# Patient Record
Sex: FEMALE | Race: WHITE | URBAN - METROPOLITAN AREA
[De-identification: names, ages, dates, MRNs, and addresses within clinical notes are randomized per-mention and may not be internally consistent; named-entity substitution may affect disease eponyms.]

---

## 2022-11-11 ENCOUNTER — OFFICE VISIT (OUTPATIENT)
Dept: FAMILY MEDICINE CLINIC | Facility: CLINIC | Age: 22
End: 2022-11-11
Payer: MEDICAID

## 2022-11-11 VITALS
HEART RATE: 74 BPM | SYSTOLIC BLOOD PRESSURE: 121 MMHG | OXYGEN SATURATION: 99 % | BODY MASS INDEX: 21.26 KG/M2 | TEMPERATURE: 97 F | WEIGHT: 120 LBS | HEIGHT: 63 IN | RESPIRATION RATE: 18 BRPM | DIASTOLIC BLOOD PRESSURE: 66 MMHG

## 2022-11-11 DIAGNOSIS — H01.113 EYELID DERMATITIS, ALLERGIC/CONTACT, RIGHT: Primary | ICD-10-CM

## 2022-11-11 PROCEDURE — 3008F BODY MASS INDEX DOCD: CPT | Performed by: NURSE PRACTITIONER

## 2022-11-11 PROCEDURE — 3074F SYST BP LT 130 MM HG: CPT | Performed by: NURSE PRACTITIONER

## 2022-11-11 PROCEDURE — 99202 OFFICE O/P NEW SF 15 MIN: CPT | Performed by: NURSE PRACTITIONER

## 2022-11-11 PROCEDURE — 3078F DIAST BP <80 MM HG: CPT | Performed by: NURSE PRACTITIONER

## 2022-11-11 RX ORDER — NEOMYCIN SULFATE, POLYMYXIN B SULFATE, AND DEXAMETHASONE 3.5; 10000; 1 MG/G; [USP'U]/G; MG/G
1 OINTMENT OPHTHALMIC 3 TIMES DAILY
Qty: 3.5 G | Refills: 0 | Status: SHIPPED | OUTPATIENT
Start: 2022-11-11 | End: 2022-11-16

## 2023-06-29 ENCOUNTER — HOSPITAL ENCOUNTER (EMERGENCY)
Age: 23
Discharge: HOME | End: 2023-06-29
Attending: EMERGENCY MEDICINE
Payer: OTHER MISCELLANEOUS

## 2023-06-29 VITALS
SYSTOLIC BLOOD PRESSURE: 140 MMHG | OXYGEN SATURATION: 100 % | HEIGHT: 63 IN | DIASTOLIC BLOOD PRESSURE: 95 MMHG | WEIGHT: 120 LBS | TEMPERATURE: 98 F | BODY MASS INDEX: 21.26 KG/M2 | RESPIRATION RATE: 16 BRPM | HEART RATE: 89 BPM

## 2023-06-29 DIAGNOSIS — S91.331A PUNCTURE WOUND OF RIGHT FOOT, INITIAL ENCOUNTER: Primary | ICD-10-CM

## 2023-06-29 PROCEDURE — 99283 EMERGENCY DEPT VISIT LOW MDM: CPT

## 2023-06-29 PROCEDURE — 90471 IMMUNIZATION ADMIN: CPT

## 2024-10-25 LAB
AMB EXT CREATININE: 170 MG/DL
AMB EXT TSH: 1.99 MIU/ML

## 2024-10-30 LAB
AMB EXT ANTIBODY SCREEN: NEGATIVE
AMB EXT GLUCOSE 1HR OB: 133
AMB EXT HBSAG SCREEN: NEGATIVE
AMB EXT HEMATOCRIT: 40.3
AMB EXT HEMOGLOBIN: 13.3
AMB EXT HEPATITIS C VIRUS ANTIBODY: NON REACTIVE
AMB EXT HIV AG AB COMBO: NON REACTIVE
AMB EXT MCV: 97
AMB EXT PLATELETS: 334
AMB EXT RH FACTOR: POSITIVE
AMB EXT RPR: NON REACTIVE

## 2024-12-20 ENCOUNTER — INITIAL PRENATAL (OUTPATIENT)
Dept: OBGYN CLINIC | Facility: CLINIC | Age: 24
End: 2024-12-20
Payer: COMMERCIAL

## 2024-12-20 ENCOUNTER — ULTRASOUND ENCOUNTER (OUTPATIENT)
Facility: CLINIC | Age: 24
End: 2024-12-20
Payer: COMMERCIAL

## 2024-12-20 ENCOUNTER — TELEPHONE (OUTPATIENT)
Dept: OBGYN CLINIC | Facility: CLINIC | Age: 24
End: 2024-12-20

## 2024-12-20 VITALS
DIASTOLIC BLOOD PRESSURE: 76 MMHG | BODY MASS INDEX: 23.04 KG/M2 | SYSTOLIC BLOOD PRESSURE: 117 MMHG | HEART RATE: 90 BPM | HEIGHT: 63 IN | WEIGHT: 130.06 LBS

## 2024-12-20 DIAGNOSIS — Z12.4 CERVICAL CANCER SCREENING: ICD-10-CM

## 2024-12-20 DIAGNOSIS — Z3A.12 12 WEEKS GESTATION OF PREGNANCY (HCC): Primary | ICD-10-CM

## 2024-12-20 DIAGNOSIS — Z13.79 GENETIC SCREENING: ICD-10-CM

## 2024-12-20 PROCEDURE — 88175 CYTOPATH C/V AUTO FLUID REDO: CPT

## 2024-12-20 RX ORDER — METOCLOPRAMIDE 10 MG/1
10 TABLET ORAL EVERY 6 HOURS PRN
Qty: 20 TABLET | Refills: 0 | Status: SHIPPED | OUTPATIENT
Start: 2024-12-20

## 2024-12-20 RX ORDER — DIPHENHYDRAMINE HYDROCHLORIDE 25 MG/1
25 CAPSULE ORAL DAILY
COMMUNITY

## 2024-12-20 RX ORDER — DOXYLAMINE SUCCINATE AND PYRIDOXINE HYDROCHLORIDE, DELAYED RELEASE TABLETS 10 MG/10 MG 10; 10 MG/1; MG/1
2 TABLET, DELAYED RELEASE ORAL NIGHTLY
COMMUNITY

## 2024-12-20 NOTE — PROGRESS NOTES
Transfer- 12w0d     Transfer of care, had initial OB appointment with provider from [facility]. She brought her early US results, seen on her phone.     OBhx -    PMHx - . Denies blood transfusion, HIV, hepatitis, HSV, VTE, or congenital heart defects   Psurghx - denies   Last pap, never had one. Pap done today     24 year old , EDC by LMP (regular periods)   -NIPT & Carrier discussed - drawn today    Had PNL done, abstracted in chart and reviewed.     Blood type B+, neg antibody screen     Nausea  -is taking diclegisis - not helping.   -Rx reglan sent to pharmacy    Maternal Hx thyroid dysfunction  -per pt her mom has a \"thyroid problem.\" Unsure the type, TSH done with PNL 1.990    Paternal Hx DM   -her father is a type 2 DM  -early 1 hr GTT done - normal     Migraines with no aura  -prior pregnancy, took ibuprofen, counseled her to take Tylenol for pain relief. Ibuprofen not recommended to take if pregnant.     Prior Cannabis use  -she used to smoke it or take a gummy. She would do it twice a month. Her partner has stopped as well  -cessation encouraged.     RTC 4 wk, next appt with OBs

## 2024-12-22 DIAGNOSIS — O36.80X0 PREGNANCY WITH INCONCLUSIVE FETAL VIABILITY, SINGLE OR UNSPECIFIED FETUS (HCC): Primary | ICD-10-CM

## 2024-12-30 ENCOUNTER — TELEPHONE (OUTPATIENT)
Facility: CLINIC | Age: 24
End: 2024-12-30

## 2025-01-02 LAB
.: NORMAL
.: NORMAL

## 2025-01-06 RX ORDER — METOCLOPRAMIDE 10 MG/1
10 TABLET ORAL EVERY 6 HOURS PRN
Qty: 20 TABLET | Refills: 1 | Status: SHIPPED | OUTPATIENT
Start: 2025-01-06

## 2025-01-16 ENCOUNTER — ROUTINE PRENATAL (OUTPATIENT)
Dept: OBGYN CLINIC | Facility: CLINIC | Age: 25
End: 2025-01-16
Payer: COMMERCIAL

## 2025-01-16 VITALS
BODY MASS INDEX: 23.39 KG/M2 | WEIGHT: 132 LBS | HEART RATE: 82 BPM | SYSTOLIC BLOOD PRESSURE: 122 MMHG | HEIGHT: 63 IN | DIASTOLIC BLOOD PRESSURE: 68 MMHG

## 2025-01-16 DIAGNOSIS — Z36.89 ENCOUNTER FOR FETAL ANATOMIC SURVEY (HCC): ICD-10-CM

## 2025-01-16 DIAGNOSIS — Z34.02 ENCOUNTER FOR SUPERVISION OF NORMAL FIRST PREGNANCY IN SECOND TRIMESTER (HCC): Primary | ICD-10-CM

## 2025-01-16 DIAGNOSIS — Z3A.15 15 WEEKS GESTATION OF PREGNANCY (HCC): ICD-10-CM

## 2025-01-16 NOTE — PROGRESS NOTES
CLAUDIA 15w6d visit #2    Today pt reports nausea is better than it was before. Taking diclegis and reglan.   Otherwise no complaints.      24 year old  dated by LMP c/w 1st tri US     #Routine prenatal care  - Partner/FOB: Vineet   - Labs: reviewed from previous provider, normal  - NIPT: low risk   - msAFP: declined  - Carrier screening: negative   - Anatomy scan ordered    Nausea  -is taking diclegis & reglan   -improving      Maternal Hx thyroid dysfunction  -per pt her mom has a \"thyroid problem.\" Unsure the type, TSH done with PNL 1.990     Paternal Hx DM   -her father is a type 2 DM  -early 1 hr GTT done - normal      Migraines with no aura  -prior pregnancy, took ibuprofen, counseled her to take Tylenol for pain relief. Ibuprofen not recommended to take if pregnant.      Prior Cannabis use  -she used to smoke it or take a gummy. She would do it twice a month. Her partner has stopped as well  -cessation encouraged.        RTC 4 wk

## 2025-02-20 ENCOUNTER — ROUTINE PRENATAL (OUTPATIENT)
Dept: OBGYN CLINIC | Facility: CLINIC | Age: 25
End: 2025-02-20
Payer: COMMERCIAL

## 2025-02-20 ENCOUNTER — ULTRASOUND ENCOUNTER (OUTPATIENT)
Facility: CLINIC | Age: 25
End: 2025-02-20
Payer: COMMERCIAL

## 2025-02-20 ENCOUNTER — TELEPHONE (OUTPATIENT)
Facility: CLINIC | Age: 25
End: 2025-02-20

## 2025-02-20 VITALS
HEIGHT: 63 IN | WEIGHT: 134.19 LBS | SYSTOLIC BLOOD PRESSURE: 116 MMHG | BODY MASS INDEX: 23.78 KG/M2 | DIASTOLIC BLOOD PRESSURE: 64 MMHG | HEART RATE: 89 BPM

## 2025-02-20 DIAGNOSIS — Z36.89 ENCOUNTER FOR FETAL ANATOMIC SURVEY (HCC): ICD-10-CM

## 2025-02-20 DIAGNOSIS — Z34.90 PRENATAL CARE, ANTEPARTUM (HCC): Primary | ICD-10-CM

## 2025-02-20 DIAGNOSIS — Z3A.20 20 WEEKS GESTATION OF PREGNANCY (HCC): ICD-10-CM

## 2025-02-20 PROCEDURE — 76805 OB US >/= 14 WKS SNGL FETUS: CPT | Performed by: STUDENT IN AN ORGANIZED HEALTH CARE EDUCATION/TRAINING PROGRAM

## 2025-02-20 NOTE — PROGRESS NOTES
CLAUDIA 20.6 visit #3    +FM, no LOF, no VB  No issues - felt really good the past 2 weeks when she takes her antinausea medicne     25 year old  dated by LMP c/w  tri      #Routine prenatal care  - Partner/FOB: Vineet   - Labs: reviewed from previous provider, normal  - NIPT: low risk   - msAFP: declined  - Carrier screening: negative   - Anatomy scan later today     Maternal Hx thyroid dysfunction  -per pt her mom has a \"thyroid problem.\" Unsure the type, TSH done with PNL 1.990     Paternal Hx DM   -her father is a type 2 DM  -early 1 hr GTT done - normal      Migraines with no aura  -prior pregnancy, took ibuprofen, counseled her to take Tylenol for pain relief. Ibuprofen not recommended to take if pregnant.      Prior Cannabis use  -she used to smoke it or take a gummy. She would do it twice a month. Her partner has stopped as well  -cessation encouraged.        RTC 4 wk

## 2025-02-25 DIAGNOSIS — Z36.89 ENCOUNTER FOR FETAL ANATOMIC SURVEY (HCC): ICD-10-CM

## 2025-02-25 DIAGNOSIS — Z34.02 ENCOUNTER FOR SUPERVISION OF NORMAL FIRST PREGNANCY IN SECOND TRIMESTER (HCC): ICD-10-CM

## 2025-02-25 DIAGNOSIS — Z36.2 ENCOUNTER FOR FOLLOW-UP ULTRASOUND OF FETAL ANATOMY (HCC): Primary | ICD-10-CM

## 2025-02-27 ENCOUNTER — ULTRASOUND ENCOUNTER (OUTPATIENT)
Facility: CLINIC | Age: 25
End: 2025-02-27
Payer: COMMERCIAL

## 2025-03-13 NOTE — PROGRESS NOTES
CLAUDIA 23.6 visit #4    +FM, no LOF, no VB      25 year old  dated by LMP c/w 1st tri US     #Routine prenatal care  - Partner/FOB: Vineet   - Labs: reviewed from previous provider, normal  - NIPT: low risk   - msAFP: declined  - Carrier screening: negative   - Anatomy scan: limited views of heart --> repeat normal   - 1 hr GTT & CBC: ordered     Maternal Hx thyroid dysfunction  -per pt her mom has a \"thyroid problem.\" Unsure the type, TSH done with PNL 1.990     Paternal Hx DM   -her father is a type 2 DM  -early 1 hr GTT done - normal      Migraines with no aura  -prior pregnancy, took ibuprofen, counseled her to take Tylenol for pain relief. Ibuprofen not recommended to take if pregnant.      Prior Cannabis use  -she used to smoke it or take a gummy. She would do it twice a month. Her partner has stopped as well  -cessation encouraged.        RTC 4 wk

## 2025-04-14 NOTE — TELEPHONE ENCOUNTER
Patient needs 4 wk OB appt, due back this week, put in a request on [email or web address] for an appt- called pt to schedule but no answer no VM, sent [email or web address] message.

## 2025-04-24 NOTE — PROGRESS NOTES
CLAUDIA - 29w6d     Had a puffy patch of skin around her nipple recently but then went away, not noticeable anymore  Baby active      25 year old  dated by LMP c/w 1st tri US     #Routine prenatal care  - Partner/FOB: Vineet   - Labs: reviewed from previous provider, normal  - NIPT: low risk   - msAFP: declined  - Carrier screening: negative   - Anatomy scan: limited views of heart --> repeat normal   - 1 hr GTT & CBC: 1h GTT elevated, then 3h GTT NORMAL. Hgb 11.2  -HIV, syphilis screen ordered  -Tdap discussed, pt considering     Maternal Hx thyroid dysfunction  -per pt her mom has a \"thyroid problem.\" Unsure the type, TSH done with PNL 1.990     Paternal Hx DM   -her father is a type 2 DM  -early 1 hr GTT done - normal      Migraines with no aura  -prior pregnancy, took ibuprofen, counseled her to take Tylenol for pain relief. Ibuprofen not recommended to take if pregnant.      Prior Cannabis use  -she used to smoke it or take a gummy. She would do it twice a month. Her partner has stopped as well  -cessation encouraged.

## 2025-05-19 NOTE — PROGRESS NOTES
Talat 33w3d    She is having pelvic discomfort - belly band discussed     PABLITO dated by LMP c/w 1st tri US      #Routine prenatal care  - Partner/FOB: Vineet   - Labs: reviewed from previous provider, normal  - NIPT: low risk   - msAFP: declined  - Carrier screening: negative   - Anatomy scan: limited views of heart --> repeat normal   - 1 hr GTT & CBC: 1h GTT elevated, then 3h GTT NORMAL. Hgb 11.2  -3rd tri HIV, syphilis screen ordered- reminded pt   -Tdap discussed, given today      Maternal Hx thyroid dysfunction  -per pt her mom has a \"thyroid problem.\" Unsure the type, TSH done with PNL 1.990     Paternal Hx DM   -her father is a type 2 DM  -early 1 hr GTT done - normal      Migraines with no aura  -prior pregnancy, took ibuprofen, counseled her to take Tylenol for pain relief. Ibuprofen not recommended to take if pregnant.      Prior Cannabis use  -she used to smoke it or take a gummy. She would do it twice a month. Her partner has stopped as well  -cessation encouraged.

## 2025-06-06 NOTE — PROGRESS NOTES
Talat 36w0d    She is doing well, no complaints.baby active   -gbs done today  -sve tight 1 cm/60/-2 cephalic     PABLITO dated by LMP c/w 1st tri US      #Routine prenatal care  - Partner/FOB: Vineet   - Labs: reviewed from previous provider, normal  - NIPT: low risk   - msAFP: declined  - Carrier screening: negative   - Anatomy scan: limited views of heart --> repeat normal   - 1 hr GTT & CBC: 1h GTT elevated, then 3h GTT NORMAL. Hgb 11.2  -3rd tri HIV, syphilis screen -done   -Tdap done 5/19/25  -has peds chosen      Maternal Hx thyroid dysfunction  -per pt her mom has a \"thyroid problem.\" Unsure the type, TSH done with PNL 1.990     Paternal Hx DM   -her father is a type 2 DM  -early 1 hr GTT done - normal      Migraines with no aura  -prior pregnancy, took ibuprofen, counseled her to take Tylenol for pain relief. Ibuprofen not recommended to take if pregnant.      Prior Cannabis use  -she used to smoke it or take a gummy. She would do it twice a month. Her partner has stopped as well  -cessation encouraged.

## 2025-06-13 NOTE — TELEPHONE ENCOUNTER
----- Message from Maria G Rhoades sent at 6/13/2025  2:16 PM CDT -----  Regarding: pitocin IOL elective  Her PABLITO is 7/4/25.   Can you schedule her for any of the following dates:   7/7, 7/8, 7/9, 7/10

## 2025-06-13 NOTE — PROGRESS NOTES
Talat 37w0d    She is doing well, no complaints.   Would like IOL after her PABLITO - will send message to schedulers for pitocin IOL  Sve today 1/60/-2 cephalic     PABLITO dated by LMP c/w 1st tri US      #Routine prenatal care  - Partner/FOB: Vineet   - Labs: reviewed from previous provider, normal  - NIPT: low risk   - msAFP: declined  - Carrier screening: negative   - Anatomy scan: limited views of heart --> repeat normal   - 1 hr GTT & CBC: 1h GTT elevated, then 3h GTT NORMAL. Hgb 11.2  -3rd tri HIV, syphilis screen -done   -Tdap done 5/19/25  -has peds chosen   -gbs done      Maternal Hx thyroid dysfunction  -per pt her mom has a \"thyroid problem.\" Unsure the type, TSH done with PNL 1.990     Paternal Hx DM   -her father is a type 2 DM  -early 1 hr GTT done - normal      Migraines with no aura  -prior pregnancy, took ibuprofen, counseled her to take Tylenol for pain relief. Ibuprofen not recommended to take if pregnant.      Prior Cannabis use  -she used to smoke it or take a gummy. She would do it twice a month. Her partner has stopped as well  -cessation encouraged.

## 2025-06-19 NOTE — PROGRESS NOTES
Talat 37w0d    Doing ok. Does have a bit of a cold - runny nose, sore throat. No fever though (checked in clinic too). No sick contacts. If starts developing fever or SOB - instructed to at least present to  for evaluation. Otherwise no other complaints. +FM    PABLITO dated by LMP c/w 1st tri US      #Routine prenatal care  - Partner/FOB: Vineet   - Labs: reviewed from previous provider, normal  - NIPT: low risk   - msAFP: declined  - Carrier screening: negative   - Anatomy scan: limited views of heart --> repeat normal   - 1 hr GTT & CBC: 1h GTT elevated, then 3h GTT NORMAL. Hgb 11.2  -3rd tri HIV, syphilis screen -done   -Tdap done 5/19/25  -has peds chosen   -gbs done   -Delivery planning: IOL scheduled 7/9      Maternal Hx thyroid dysfunction  -per pt her mom has a \"thyroid problem.\" Unsure the type, TSH done with PNL 1.990     Paternal Hx DM   -her father is a type 2 DM  -early 1 hr GTT done - normal      Migraines with no aura  -prior pregnancy, took ibuprofen, counseled her to take Tylenol for pain relief. Ibuprofen not recommended to take if pregnant.      Prior Cannabis use  -she used to smoke it or take a gummy. She would do it twice a month. Her partner has stopped as well  -cessation encouraged.

## 2025-06-27 NOTE — PROGRESS NOTES
Talat 39w0d    She is doing well, no complaints  Sve today 1/70/-2 offered to strip membranes. Pt declined       PABLITO dated by LMP c/w 1st tri US      #Routine prenatal care  - Partner/FOB: Vineet   - Labs: reviewed from previous provider, normal  - NIPT: low risk   - msAFP: declined  - Carrier screening: negative   - Anatomy scan: limited views of heart --> repeat normal   - 1 hr GTT & CBC: 1h GTT elevated, then 3h GTT NORMAL. Hgb 11.2  -3rd tri HIV, syphilis screen -done   -Tdap done 5/19/25  -has peds chosen   -gbs done   -Delivery planning: IOL scheduled 7/9      Maternal Hx thyroid dysfunction  -per pt her mom has a \"thyroid problem.\" Unsure the type, TSH done with PNL 1.990     Paternal Hx DM   -her father is a type 2 DM  -early 1 hr GTT done - normal      Migraines with no aura  -prior pregnancy, took ibuprofen, counseled her to take Tylenol for pain relief. Ibuprofen not recommended to take if pregnant.      Prior Cannabis use  -she used to smoke it or take a gummy. She would do it twice a month. Her partner has stopped as well  -cessation encouraged.

## 2025-07-05 NOTE — PROGRESS NOTES
Pt is a 25 year old female admitted to [facility],   Chief Complaint   Patient presents with    R/o Labor      Pt is 40w1d intra-uterine pregnancy. Patient noticed spotting when wiping this morning at 0500. Patient states uterine contractions start at that time and have been increasing in frequency and intensity since. Denies any leaking of fluid. Reports +fetal movement.   History obtained.  Oriented to room, staff, and plan of care.

## 2025-07-05 NOTE — ANESTHESIA PROCEDURE NOTES
Labor Analgesia    Date/Time: 7/5/2025 2:13 PM    Performed by: Rosana Jerez MD  Authorized by: Rosana Jerez MD      General Information and Staff    Start Time:  7/5/2025 2:13 PM  End Time:  7/5/2025 2:22 PM  Anesthesiologist:  Rosana Jerez MD  Performed by:  Anesthesiologist  Patient Location:  OB  Site Identification: surface landmarks    Reason for Block: labor epidural    Preanesthetic Checklist: patient identified, IV checked, risks and benefits discussed, monitors and equipment checked, pre-op evaluation, timeout performed, IV bolus, anesthesia consent and sterile technique used      Procedure Details    Patient Position:  Sitting  Prep: ChloraPrep    Monitoring:  Heart rate and continuous pulse ox  Approach:  Midline    Epidural Needle    Injection Technique:  OLAF saline  Needle Type:  Tuohy  Needle Gauge:  17 G  Needle Length:  3.375 in  Needle Insertion Depth:  5.5  Location:  L3-4    Spinal Needle    Needle Type:  Pencil-tip    Catheter    Catheter Type:  End hole  Catheter Size:  19 G  Catheter at Skin Depth:  11  Test Dose:  Negative    Assessment    Sensory Level:  T6    Additional Comments

## 2025-07-05 NOTE — PROGRESS NOTES
Patient care assumed from Haley SILVESTRE RN. Patient SVE 1.5/90/-2. Will update physician after NST

## 2025-07-05 NOTE — ANESTHESIA PREPROCEDURE EVALUATION
PRE-OP EVALUATION    Patient Name: Ivelisse Morel    Admit Diagnosis: Pregnancy (HCC) [Z34.90]    Pre-op Diagnosis: * No pre-op diagnosis entered *        Anesthesia Procedure: LABOR ANALGESIA    * No surgeons found in log *    Pre-op vitals reviewed.  Temp: 98.2 °F (36.8 °C)  Pulse: 90  Resp: 16  BP: 127/86     Body mass index is 28.52 kg/m².    Current medications reviewed.  Hospital Medications:  Current Medications[1]    Outpatient Medications:   Prescriptions Prior to Admission[2]    Allergies: Patient has no known allergies.      Anesthesia Evaluation    Patient summary reviewed.    Anesthetic Complications  (-) history of anesthetic complications         GI/Hepatic/Renal    Negative GI/hepatic/renal ROS.                             Cardiovascular    Negative cardiovascular ROS.        MET: >4                                           Endo/Other    Negative endo/other ROS.                              Pulmonary    Negative pulmonary ROS.                       Neuro/Psych    Negative neuro/psych ROS.                                  Past Surgical History[3]  Social Hx on file[4]  History   Drug Use Unknown     Available pre-op labs reviewed.  Lab Results   Component Value Date    WBC 18.9 (H) 07/05/2025    RBC 3.92 07/05/2025    HGB 11.7 (L) 07/05/2025    HCT 34.1 (L) 07/05/2025    MCV 87.0 07/05/2025    MCH 29.8 07/05/2025    MCHC 34.3 07/05/2025    RDW 12.8 07/05/2025    .0 07/05/2025               Airway      Mallampati: II  Mouth opening: >3 FB  TM distance: > 6 cm  Neck ROM: full Cardiovascular    Cardiovascular exam normal.  Rhythm: regular  Rate: normal     Dental  Comment: Denies chipped or loose teeth           Pulmonary    Pulmonary exam normal.  Breath sounds clear to auscultation bilaterally.               Other findings              ASA: 2   Plan: epidural  NPO status verified and patient meets guidelines.    Post-procedure pain management plan discussed with surgeon and  patient.    Comment: Risks discussed with the patient includes but not limited to headaches, back pain, transient or permanent neuropathy, trauma to lips, teeth, sore throat, anaphylaxis, myocardial infarction, aspiration. All questions answered. Patient wishes to proceed.  Plan/risks discussed with: patient and spouse                Present on Admission:  **None**             [1]    lactated ringers infusion   Intravenous Continuous    dextrose in lactated ringers 5% infusion   Intravenous PRN    lactated ringers IV bolus 500 mL  500 mL Intravenous PRN    acetaminophen (Tylenol Extra Strength) tab 500 mg  500 mg Oral Q6H PRN    acetaminophen (Tylenol Extra Strength) tab 1,000 mg  1,000 mg Oral Q6H PRN    ibuprofen (Motrin) tab 600 mg  600 mg Oral Once PRN    ondansetron (Zofran) 4 MG/2ML injection 4 mg  4 mg Intravenous Q6H PRN    oxyTOCIN in sodium chloride 0.9% (Pitocin) 30 Units/500mL infusion premix  62.5-900 fidencio-units/min Intravenous Continuous    terbutaline (Brethine) 1 MG/ML injection 0.25 mg  0.25 mg Subcutaneous PRN    sodium citrate-citric acid (Bicitra) 500-334 MG/5ML oral solution 30 mL  30 mL Oral PRN    ropivacaine (Naropin) 0.5% injection  30 mL Injection PRN    metoclopramide (Reglan) 5 mg/mL injection 10 mg  10 mg Intravenous Q6H PRN    calcium carbonate (Tums) chewable tab 1,000 mg  1,000 mg Oral Q4H PRN    lactated ringers IV bolus 1,000 mL  1,000 mL Intravenous Once    fentaNYL-bupivacaine 2 mcg/mL-0.125% in sodium chloride 0.9% 100 mL EPIDURAL infusion premix  12 mL/hr Epidural Continuous    fentaNYL (Sublimaze) 50 mcg/mL injection 100 mcg  100 mcg Epidural Once    lidocaine 1.5%-EPINEPHrine 1:200,000 (Xylocaine-Epinephrine) injection  5 mL Injection PRN    bupivacaine PF (Marcaine) 0.25% injection  30 mL Injection PRN    lidocaine PF (Xylocaine-MPF) 2% injection  5 mL Injection PRN    sodium chloride 0.9% PF injection 10 mL  10 mL Injection PRN    ePHEDrine (PF) 25 MG/5 ML injection 5 mg  5  mg Intravenous PRN    nalbuphine (Nubain) 10 mg/mL injection 2.5 mg  2.5 mg Intravenous Q15 Min PRN   [2]   Medications Prior to Admission   Medication Sig Dispense Refill Last Dose/Taking    Prenatal MV-Min-Fe Fum-FA-DHA (PRENATAL 1 OR) Take by mouth.   7/4/2025    metoclopramide 10 MG Oral Tab TAKE ONE TABLET BY MOUTH EVERY SIX HOURS AS NEEDED (Patient not taking: No sig reported) 20 tablet 1 More than a month    doxylamine-pyridoxine 10-10 MG Oral Tab EC Take 2 tablets by mouth nightly.   More than a month    Pyridoxine HCl (VITAMIN B-6) 25 MG Oral Tab Take 1 tablet (25 mg total) by mouth in the morning.   More than a month   [3] History reviewed. No pertinent surgical history.  [4]   Social History  Socioeconomic History    Marital status: Single   Tobacco Use    Smoking status: Never    Smokeless tobacco: Never   Vaping Use    Vaping status: Never Used   Substance and Sexual Activity    Alcohol use: Not Currently     Alcohol/week: 2.0 standard drinks of alcohol     Types: 2 Shots of liquor per week     Comment: Varely rarely special occasions/ weekend small, drinking    Drug use: Never   Other Topics Concern    Caffeine Concern No    Exercise No    Seat Belt No    Special Diet No    Stress Concern No    Weight Concern No

## 2025-07-05 NOTE — PLAN OF CARE
Problem: BIRTH - VAGINAL/ SECTION  Goal: Fetal and maternal status remain reassuring during the birth process  Description: INTERVENTIONS:  - Monitor vital signs  - Monitor fetal heart rate  - Monitor uterine activity  - Monitor labor progression (vaginal delivery)  - DVT prophylaxis (C/S delivery)  - Surgical antibiotic prophylaxis (C/S delivery)  Outcome: Progressing     Problem: PAIN - ADULT  Goal: Verbalizes/displays adequate comfort level or patient's stated pain goal  Description: INTERVENTIONS:  - Encourage pt to monitor pain and request assistance  - Assess pain using appropriate pain scale  - Administer analgesics based on type and severity of pain and evaluate response  - Implement non-pharmacological measures as appropriate and evaluate response  - Consider cultural and social influences on pain and pain management  - Manage/alleviate anxiety  - Utilize distraction and/or relaxation techniques  - Monitor for opioid side effects  - Notify MD/LIP if interventions unsuccessful or patient reports new pain  - Anticipate increased pain with activity and pre-medicate as appropriate  Outcome: Progressing     Problem: ANXIETY  Goal: Will report anxiety at manageable levels  Description: INTERVENTIONS:  - Administer medication as ordered  - Teach and rehearse alternative coping skills  - Provide emotional support with 1:1 interaction with staff  Outcome: Progressing     Problem: Patient/Family Goals  Goal: Patient/Family Long Term Goal  Description: Patient's Long Term Goal: Adequate pain management    Interventions:  - Offer pain meds and epidural  - See additional Care Plan goals for specific interventions  Outcome: Progressing  Goal: Patient/Family Short Term Goal  Description: Patient's Short Term Goal: Have a safe delivery     Interventions:   - Follow policies and procedures. Keep provider informed of pt status  - See additional Care Plan goals for specific interventions  Outcome: Progressing

## 2025-07-05 NOTE — PROGRESS NOTES
EFMs applied, FHTs 135. Pt was seen in Triage earlier today and sent home in early labor.  Pt states ctxs are now every 1.5-2 min. Pt states she continues to have bloody show, Pt denies LOF. Pt reports +FM. PT denies any problems with pregnancy. Pt denies any medical Hx. Admission assessment initiated at this time.    DISCHARGE

## 2025-07-05 NOTE — H&P
[facility]  Obstetrics and Gynecology  History & Physical    Ivelisse Morel Patient Status:  Inpatient    2000 MRN [id number]   Location [facility] LABOR & DELIVERY Attending Judith Dominguez MD   Hospital Day 0 PCP SHAE MONTES DO     CC: Patient is here for labor management     SUBJECTIVE:    Ivelisse Morel is a 25 year old  female at 40w1d Estimated Date of Delivery: 25 who is being admitted for labor management. Was seen earlier this AM with complaints of contractions q5-6 minutes. Was unchanged at 1-2 cm after 1 hour of walking. Returned with worsening pain and SVE: 5/100/-2. Admitted for further management.     Her current obstetrical history is significant for:  Migraines without aura  Prior cannabis use      Patient reports now doing well with epidural in place. Was able to get some sleep. AROM performed at 1800 and SVE now: 8-9/100/0.     PABLITO Confirmation  LMP: Patient's last menstrual period was 2024 (within days).  PABLITO: 2025, by Last Menstrual Period       Obstetric History:   OB History    Para Term  AB Living   1        SAB IAB Ectopic Multiple Live Births             # Outcome Date GA Lbr Sudheer/2nd Weight Sex Type Anes PTL Lv   1 Current              Past Medical History: Past Medical History[1]  Past Social History: Past Surgical History[2]  Family History: Family History[3]    Social History:   Social History     Tobacco Use    Smoking status: Never    Smokeless tobacco: Never   Substance Use Topics    Alcohol use: Not Currently     Alcohol/week: 2.0 standard drinks of alcohol     Types: 2 Shots of liquor per week     Comment: Varely rarely special occasions/ weekend small, drinking       Home Meds: Prescriptions Prior to Admission[4]  Allergies: Allergies[5]    OBJECTIVE:    Temp:  [98.2 °F (36.8 °C)-98.4 °F (36.9 °C)] 98.4 °F (36.9 °C)  Pulse:  [] 93  Resp:  [16] 16  BP: (106-141)/(59-92) 129/85  SpO2:  [98 %-99 %]  98 %  Body mass index is 28.52 kg/m².    General: AAO. NAD.   Lungs: no tachypnea, retractions or cyanosis  CV: normal peripheral perfusion   Abdomen: FHT present, gravid     FHT: moderate variability/135 BPM / Positive accelerations/Negative decelerations   TOCO: q 2-4 minutes    SVE: 8- / 100 / 0      Prenatal Labs Brief Review   Blood Type:   Lab Results   Component Value Date    ABO B 2025    RH Positive 2025     GBS:  Negative    Inpatient labs:  Lab Results   Component Value Date    WBC 18.9 2025    HGB 11.7 2025    HCT 34.1 2025    .0 2025       ASSESSMENT/ PLAN:    Ivelisse Morel is a 25 year old  female at 40w1d Estimated Date of Delivery: 25 who is being admitted for labor management.    Problem List[6]    1. Labor:    - continue expectant management    - AROM, clear fluid at 1800  2. Fetal monitoring: CEFM  3. GBS: negative  4. Pain: epidural in place    Risks, benefits, alternatives and possible complications have been discussed in detail with the patient.  Pre-admission, admission, and post admission procedures and expectations were discussed in detail.  All questions answered, all appropriate consents will be signed at the Hospital. Admission is planned for today.  anticipated.    Judith Dominguez MD    [facility] - OBGYN      Note to patient and family   The 21st Century Cures Act makes medical notes available to patients in the interest of transparency.  However, please be advised that this is a medical document.  It is intended as qwen-yl-vgrn communication.  It is written and medical language may contain abbreviations or verbiage that are technical and unfamiliar.  It may appear blunt or direct.  Medical documents are intended to carry relevant information, facts as evident, and the clinical opinion of the practitioner.                 [1]   Past Medical History:   Screening for genetic disease carrier status    Carrier Screen = NEGATIVE  FOR 14 OUT OF 14 DISEASES   [2] History reviewed. No pertinent surgical history.  [3]   Family History  Problem Relation Age of Onset    Diabetes Father         Type 2 diabetes    Heart Disorder Father    [4]   Medications Prior to Admission   Medication Sig Dispense Refill Last Dose/Taking    Prenatal MV-Min-Fe Fum-FA-DHA (PRENATAL 1 OR) Take by mouth.   7/4/2025    metoclopramide 10 MG Oral Tab TAKE ONE TABLET BY MOUTH EVERY SIX HOURS AS NEEDED (Patient not taking: No sig reported) 20 tablet 1 More than a month    doxylamine-pyridoxine 10-10 MG Oral Tab EC Take 2 tablets by mouth nightly.   More than a month    Pyridoxine HCl (VITAMIN B-6) 25 MG Oral Tab Take 1 tablet (25 mg total) by mouth in the morning.   More than a month   [5] No Known Allergies  [6]   Patient Active Problem List  Diagnosis    Pregnancy (HCC)    Uterine contractions (HCC)

## 2025-07-05 NOTE — PROGRESS NOTES
Pt is d/c'd to home ins table condition, not in active labor. Both written and verbalizes instructions provided. Questions answered. Pt verbalizes understanding and  agreement.

## 2025-07-05 NOTE — DISCHARGE INSTRUCTIONS
Discharge Instructions    Diet: Regular  Activity: Normal activity         General Instructions    Call your OB doctor if: Fluid leaking from your vagina;Uterine contractions 5 minutes or closer for 1 to 2 hours;Uterine contractions increasing in intensity and frequency;Decrease in fetal movement;Vaginal bleeding;Temperature greater than 100F  Early labor comfort measures: Drink fluids and eat small light meals;Relax, sleep, take a warm bath or shower for 30 minutes or less;Take a walk

## 2025-07-05 NOTE — PROGRESS NOTES
07/05/25 0955   Nonstress Test   Multiple NST? No   Variability 6-25 BPM   Accelerations Yes   Acoustic Stimulator No   Baseline 135 BPM   Uterine Irritability No   Contractions Irregular   Interpretation   Nonstress Test Interpretation Reactive

## 2025-07-06 NOTE — PROGRESS NOTES
Labor Analgesia Follow Up Note    Patient underwent epidural anesthesia for labor analgesia,    Placenta Date/Time: 7/5/2025  8:11 PM    Delivery Date/Time:: 7/5/2025  8:08 PM    /75 (BP Location: Left arm)   Pulse 101   Temp 98 °F (36.7 °C) (Oral)   Resp 17   Ht 1.6 m (5' 3\")   Wt 73 kg (161 lb)   LMP 09/27/2024 (Within Days)   SpO2 96%   Breastfeeding Yes   BMI 28.52 kg/m²     Assessment:  Patient seen and no apparent anesthesia related complications.    Thank you for asking us to participate in the care of your patient.

## 2025-07-06 NOTE — PLAN OF CARE
NURSING ADMISSION NOTE      Patient admitted via Wheelchair  Oriented to room.  Safety precautions initiated.  Bed in low position.  Call light in reach.    ID bands checked with LD RN    Problem: POSTPARTUM  Goal: Long Term Goal:Experiences normal postpartum course  Description: INTERVENTIONS:  - Assess and monitor vital signs and lab values.  - Assess fundus and lochia.  - Provide ice/sitz baths for perineum discomfort.  - Monitor healing of incision/episiotomy/laceration, and assess for signs and symptoms of infection and hematoma.  - Assess bladder function and monitor for bladder distention.  - Provide/instruct/assist with pericare as needed.  - Provide VTE prophylaxis as needed.  - Monitor bowel function.  - Encourage ambulation and provide assistance as needed.  - Assess and monitor emotional status and provide social service/psych resources as needed.  - Utilize standard precautions and use personal protective equipment as indicated. Ensure aseptic care of all intravenous lines and invasive tubes/drains.  - Obtain immunization and exposure to communicable diseases history.  Outcome: Progressing  Goal: Optimize infant feeding at the breast  Description: INTERVENTIONS:  - Initiate breast feeding within first hour after birth.   - Monitor effectiveness of current breast feeding efforts.  - Assess support systems available to mother/family.  - Identify cultural beliefs/practices regarding lactation, letdown techniques, maternal food preferences.  - Assess mother's knowledge and previous experience with breast feeding.  - Provide information as needed about early infant feeding cues (e.g., rooting, lip smacking, sucking fingers/hand) versus late cue of crying.  - Discuss/demonstrate breast feeding aids (e.g., infant sling, nursing footstool/pillows, and breast pumps).  - Encourage mother/other family members to express feelings/concerns, and actively listen.  - Educate father/SO about benefits of breast  feeding and how to manage common lactation challenges.  - Recommend avoidance of specific medications or substances incompatible with breast feeding.  - Assess and monitor for signs of nipple pain/trauma.  - Instruct and provide assistance with proper latch.  - Review techniques for milk expression (breast pumping) and storage of breast milk. Provide pumping equipment/supplies, instructions and assistance, as needed.  - Encourage rooming-in and breast feeding on demand.  - Encourage skin-to-skin contact.  - Provide LC support as needed.  - Assess for and manage engorgement.  - Provide breast feeding education handouts and information on community breast feeding support.   Outcome: Progressing  Goal: Establishment of adequate milk supply with medication/procedure interruptions  Description: INTERVENTIONS:  - Review techniques for milk expression (breast pumping).   - Provide pumping equipment/supplies, instructions, and assistance until it is safe to breastfeed infant.  Outcome: Progressing  Goal: Experiences normal breast weaning course  Description: INTERVENTIONS:  - Assess for and manage engorgement.  - Instruct on breast care.  - Provide comfort measures.  Outcome: Progressing  Goal: Appropriate maternal -  bonding  Description: INTERVENTIONS:  - Assess caregiver- interactions.  - Assess caregiver's emotional status and coping mechanisms.  - Encourage caregiver to participate in  daily care.  - Assess support systems available to mother/family.  - Provide /case management support as needed.  Outcome: Progressing

## 2025-07-06 NOTE — PROGRESS NOTES
Patient up to bathroom with assist.  Voided 350 ml . Patient transferred to mother/baby room 2212 per wheelchair in stable condition with baby and personal belongings.  Accompanied by significant other and staff.  Report given to Colleen LAWRENCE.

## 2025-07-06 NOTE — DISCHARGE INSTRUCTIONS
Postpartum care: What to expect after a vaginal birth  When caring for a , you might forget to care for yourself. But that's important too. Learn what's involved as you recover from giving birth.  Pregnancy changes a body in more ways than you might expect. And that doesn't stop when you give birth. Here's what can happen physically and emotionally after a vaginal delivery.  Vaginal soreness  You might have had a tear in your vagina during delivery. Or your healthcare professional may have made a cut in the vaginal opening, called an episiotomy, to make delivery easier. The wound may hurt for a few weeks. Large tears can take longer to heal. To ease the pain:  Sit on a pillow or padded ring.  Cool the area with an ice pack. Or put a chilled witch hazel pad between a sanitary napkin and the area between your vaginal opening and anus. That area is called the perineum.  Use a squirt bottle to spray warm water over the perineum as you urinate.  Sit in a warm bath just deep enough to cover your buttocks and hips for five minutes. Use cold water if it feels better.  Take a pain reliever that you can buy without a prescription. Ask your healthcare professional about a numbing spray or cream, if needed.  .Avoid straining due to constipation through adequate hydration and a diet that incorporates whole foods that are plant-based.  If this is not enough to keep your stools a toothpaste like consistency, please add over-the-counter fiber supplementation like Metamucil or a daily osmotic laxative like Miralax.  You can take one capful of Miralax with water or juice each morning and, as needed, in the evening.  While having a bowel movement, you can use can also use a stool under your feet or a squatty potty to help prevent straining.  Tell your healthcare professional if you have intense pain, lasting pain or if the pain gets worse. It could be a sign of an infection.    Vaginal discharge  After delivery, a mix of blood,  mucus and tissue from the uterus comes out of the vagina. This is called discharge. The discharge changes color and lessens over 4 to 6 weeks after a baby is born. It starts bright red, then turns darker red. After that, it usually turns yellow or white. The discharge then slows and becomes watery until it stops.  Contact your healthcare professional if blood from your vagina soaks a pad hourly for two hours in a row, especially if you also have a fever, pelvic pain or tenderness.  Contractions  You might feel contractions, sometimes called afterpains, for a few days after delivery. These contractions often feel like menstrual cramps. They help keep you from bleeding too much because they put pressure on the blood vessels in the uterus. Afterpains are common during breastfeeding. That's because breastfeeding causes the release of the hormone oxytocin.  To ease the pain, you can use acetaminophen (Tylenol, others) or ibuprofen (Advil, Motrin IB, others).  Leaking urine  Pregnancy, labor and a vaginal delivery can stretch or hurt your pelvic floor muscles. These muscles support the uterus, bladder and rectum. As a result, some urine might leak when you sneeze, laugh or cough. The leaking usually gets better within a week. But it might go on longer. Leaking urine also is called incontinence.  Until the leaking stops, wear sanitary pads. Do pelvic floor muscle training, also called Kegels, to tone your pelvic floor muscles and help control your bladder.  To do Kegels, think of sitting on a marble. Tighten your pelvic muscles as if you're lifting the marble. Try it for three seconds at a time, then relax for a count of three. Work up to doing the exercise 10 to 15 times in a row, at least three times a day. To make sure you're doing Kegels right, it might help to see a physical therapist who specializes in pelvic floor exercises.  Hemorrhoids and bowel movements  If you notice pain during bowel movements and feel  swelling near your anus, you might have swollen veins in the anus or lower rectum, called hemorrhoids. To ease hemorrhoid pain:  Use a hemorrhoid cream or a medicine that you put into your anus, called a suppository, that has hydrocortisone. You can buy either without a prescription.  Wipe the area with pads that have witch hazel or a numbing agent.  Soak your anal area in plain warm water for 10 to 15 minutes 2 to 3 times a day.  You might be afraid to have a bowel movement because you don't want to make the pain of hemorrhoids or your episiotomy wound worse. Take steps to keep stools soft and regular. Eat foods high in fiber, including fruits, vegetables and whole grains. Drink plenty of water. Ask your healthcare professional about a stool softener, if needed.  Sore breasts  A few days after giving birth, you might have full, firm, sore breasts. That's because your breast tissue overfills with milk, blood and other fluids. This condition is called engorgement. Breastfeed your baby often on both breasts to help keep them from overfilling.  If your breasts are engorged, your baby might have trouble attaching for breastfeeding. To help your baby latch on, you can use your hand or a breast pump to let out some breast milk before feeding your baby. That process is called expressing.  To ease sore breasts, put warm washcloths on them or take a warm shower before breastfeeding or expressing. That can make it easier for the milk to flow. Between feedings, put cold washcloths on your breasts. Pain relievers you can buy without a prescription might help too.  If you're not breastfeeding, wear a bra that supports your breasts, such as a sports bra. Don't pump your breasts or express the milk. That causes your breasts to make more milk. Putting ice packs on your breasts can ease discomfort. Pain relievers available without a prescription also can be helpful.  Hair loss and skin changes  During pregnancy, higher hormone  levels mean your hair grows faster than it sheds. The result is more hair on your head. But for up to five months after giving birth, you lose more hair than you grow. This hair loss stops over time.  Stretch marks on the skin don't go away after delivery. But in time, they fade. Expect any skin that got darker during pregnancy, such as dark patches on your face, to fade slowly too.  Mood changes  Childbirth can trigger a lot of feelings. Many people have a period of feeling down or anxious after giving birth, sometimes called the baby blues. Symptoms include mood swings, crying spells, anxiety and trouble sleeping. These feelings often go away within two weeks. In the meantime, take good care of yourself. Share your feelings, and ask your partner, loved ones or friends for help.  If you have large mood swings, don't feel like eating, are very tired and lack becky in life shortly after childbirth, you might have postpartum depression. Contact your healthcare professional if you think you might be depressed. Be sure to seek help if:  Your symptoms don't go away on their own.  You have trouble caring for your baby.  You have a hard time doing daily tasks.  You think of harming yourself or your baby.    Medicines and counseling often can ease postpartum depression.  Please talk to your provider if you are interested in either of these treatments.  Weight loss  It's common to still look pregnant after giving birth. Most people lose about 13 pounds (6 kilograms) during delivery. This loss includes the weight of the baby, placenta and amniotic fluid.  In the days after delivery, you'll lose more weight from leftover fluids. After that, a healthy diet and regular exercise can help you to return to the weight you were before pregnancy.  Postpartum checkups  The American College of Obst[employer] Gy[employer] says that postpartum care should be an ongoing process rather than a single visit after delivery. Check in with  your healthcare professional within 2 to 3 weeks after delivery by phone or in person to talk about any issues you've had since giving birth.  Within 6 to 12 weeks after delivery, see your healthcare professional for a complete postpartum exam. During this visit, your healthcare professional does a physical exam and checks your belly, vagina, cervix and uterus to see how well you're healing.  Things to talk about at this visit include:  Your mood and emotional well-being.  How well you're sleeping.  Other symptoms you might have, such as tiredness.  Birth control and birth spacing.  Baby care and feeding.  When you can start having sex again.  What you can do about pain with sex or not wanting to have sex.  How you're adjusting to life with a new baby.  This checkup is a chance for you and your healthcare professional to make sure you're OK. It's also a time to get answers to questions you have about life after giving birth.

## 2025-07-06 NOTE — PLAN OF CARE
Problem: BIRTH - VAGINAL/ SECTION  Goal: Fetal and maternal status remain reassuring during the birth process  Description: INTERVENTIONS:  - Monitor vital signs  - Monitor fetal heart rate  - Monitor uterine activity  - Monitor labor progression (vaginal delivery)  - DVT prophylaxis (C/S delivery)  - Surgical antibiotic prophylaxis (C/S delivery)  Outcome: Progressing     Problem: PAIN - ADULT  Goal: Verbalizes/displays adequate comfort level or patient's stated pain goal  Description: INTERVENTIONS:  - Encourage pt to monitor pain and request assistance  - Assess pain using appropriate pain scale  - Administer analgesics based on type and severity of pain and evaluate response  - Implement non-pharmacological measures as appropriate and evaluate response  - Consider cultural and social influences on pain and pain management  - Manage/alleviate anxiety  - Utilize distraction and/or relaxation techniques  - Monitor for opioid side effects  - Notify MD/LIP if interventions unsuccessful or patient reports new pain  - Anticipate increased pain with activity and pre-medicate as appropriate  Outcome: Progressing     Problem: ANXIETY  Goal: Will report anxiety at manageable levels  Description: INTERVENTIONS:  - Administer medication as ordered  - Teach and rehearse alternative coping skills  - Provide emotional support with 1:1 interaction with staff  Outcome: Progressing     Problem: Patient/Family Goals  Goal: Patient/Family Long Term Goal  Description: Patient's Long Term Goal: Adequate pain management and   Uncomplicated vaginal delivery    Interventions:  VS per protocol  I&O  Ice chips and sips as tolerated  EFM per protocol  Maintain IV as ordered  Antibiotics as needed per protocol  Informed consent  Outcome: Progressing  Goal: Patient/Family Short Term Goal  Description: Patient's Short Term Goal: Have a safe delivery    Interventions:  Pain assessment scores as ordered  Patient scores pain a \"3\" or  less  Multidisciplinary care   Nonpharmacologic comfort measures    Outcome: Progressing

## 2025-07-06 NOTE — PROGRESS NOTES
PPD#1    Patient has no complaints, lochia is minimal    /65 (BP Location: Left arm)   Pulse 78   Temp 98.2 °F (36.8 °C) (Oral)   Resp 16   Ht 63\"   Wt 161 lb (73 kg)   LMP 2024 (Within Days)   SpO2 96%   Breastfeeding Yes   BMI 28.52 kg/m²     Recent Labs   Lab 25  1402 25  0651   RBC 3.92 3.27*   HGB 11.7* 9.7*   HCT 34.1* 29.5*   MCV 87.0 90.2   MCH 29.8 29.7   MCHC 34.3 32.9   RDW 12.8 12.8   NEPRELIM 17.64* 12.98*   WBC 18.9* 15.7*   .0 351.0       Abdomen: soft, NT/ND  Uterus: firm, below umbilicus    A/P: s/p , anemia  - feso4 daily  - doing well  - home tomorrow

## 2025-07-06 NOTE — PROGRESS NOTES
Patient received alert and oriented x4 in Labor Room 114, color pink/skin warm an dry with IV fluids infusing: D5 LR on infusion pump at 125 ml/hr via # 20 g angio in right forearm. No pain/redness/swelling noted at site. Epidural anesthesia infusing, denies pain, verbalizes feeling pressure.  Bed in low locked position, siderails up x2, call light within patient reach. See flow sheet for further assessment.

## 2025-07-06 NOTE — L&D DELIVERY NOTE
Felice Morel [[id number]]      Labor Events     labor?: No   steroids?: None  Antibiotics received during labor?: No  Rupture date/time: 2025 1800     Rupture type: AROM  Fluid color: Clear  Labor type: Spontaneous Onset of Labor  Augmentation: AROM  Indications for augmentation: Ineffective Contraction Pattern  Intrapartum & labor complications: None       Labor Event Times    Labor onset date/time: 2025 0530  Dilation complete date/time: 20257  Start pushing date/time: 2025 19:57        Presentation    Presentation: Vertex       Operative Delivery    No data filed       Shoulder Dystocia    No data filed       Anesthesia    Method: Epidural               Delivery      Head delivery date/time: 2025 20:08:44   Delivery date/time:  25 20:08:54   Delivery type: Normal spontaneous vaginal delivery    Details:     Delivery location: delivery room       Delivery Providers    Delivering Clinician: Judith Dominguez MD   Delivery personnel:  Provider Role   Penny Bartlett RN Baby Nurse   Cyndi Hare RN Delivery Nurse   Rosana Jerez MD Anesthesiologist             Cord    Vessels: 3 Vessels  Complications: Nuchal  # of loops: 1  Timed cord clamping: Yes  Time in sec: 50  Cord blood disposition: to lab  Gases sent?: No       Resuscitation    Method: None       [place] Measurements      Weight: 3420 g 7 lb 8.6 oz Length: 50.8 cm     Head circum.: 34.5 cm Chest circum.: 34.5 cm      Abdominal circum.: 30.3 cm           Placenta    Date/time: 2025 20:11  Removal: Spontaneous  Appearance: Intact  Disposition: held for future pathology       Apgars    Living status: Living   Apgar Scoring Key:    0 1 2    Skin color Blue or pale Acrocyanotic Completely pink    Heart rate Absent <100 bpm >100 bpm    Reflex irritability No response Grimace Cry or active withdrawal    Muscle tone Limp Some flexion Active motion    Respiratory effort Absent  Weak cry; hypoventilation Good, crying              1 Minute:  5 Minute:  10 Minute:  15 Minute:  20 Minute:      Skin color: 0  1       Heart rate: 2  2       Reflex irritablity: 2  2       Muscle tone: 2  2       Respiratory effort: 2  2       Total: 8  9          Apgars assigned by: CHRIS VICK  [place] disposition: with mother       Skin to Skin    Skin to skin initiated date/time: 2025  Skin to skin with: Mother       Vaginal Count    Initial count RN: Tasia Kc RN  Initial count Tech: Emi Reeves    Initial counts 11   0    Final counts 11   2           Lacerations    Episiotomy: None  Labial laceration: bilateral                    Vaginal Delivery Note          Ivelisse Morel Patient Status:  Inpatient    2000 MRN [id number]   [facility] LABOR & DELIVERY Attending Judith Dominguez MD   Hosp Day # 0 PCP SHAE MONTES DO     Date of Delivery: 25    Pre Op Dx:  IUP at Term    Post Op Dx: Same - delivered    Op: Normal Spontaneous Vaginal Delivery    Surgeon: Judith Dominguez MD      Anesthesia: Epidural      Indications:  Patient is a 25 year old  at 40w1d who presented for labor management. She was vladimir painfully every 2-3 minutes and was 5 cm on admission. Epidural was given for pain management per patient request. AROM was performed at 1800 with clear fluid. She progressed to complete cervical dilation.     Findings:    Sex: male     Weight:3420g      Apgars: 8/9      Lacerations: bilateral labial lacerations, R > L   Nuchal: x1      Procedure:  The patients was placed in the dorsolithotomy position and prepped.  She was encouraged to push.  As the head was delivered in GARRET position, the perineum was supported to decrease the risk of tearing. The shoulders rotated easily and delivery was completed without complication.  Bulb suction was performed.  The cord was doubly clamped then cut after 60 seconds of cord  pulsation noted.  The baby was placed on the mother's abdomen at her request.  The cord blood was sampled. IV Pitocin was initiated. Placenta delivered spontaneosly by uterine massage. Uterus noted to be firm. Good hemostasis noted. The perineum, vaginal mucosa and cervix was then examined.     The bilateral labial lacerations were repaired with both 2-0 and 3-0 rapide vicryl.  Bleeding was minimal.  The patient was then moved to the supine position in stable condition.  Sponge and instrument counts were correct.    Complications:  None  QBL: 378 mL:     Mother and infant in good condition.    Judith Dominguez MD   [facility] - OBGYN        Note to patient and family   The 21st Century Cures Act makes medical notes available to patients in the interest of transparency.  However, please be advised that this is a medical document.  It is intended as [id number] communication.  It is written and medical language may contain abbreviations or verbiage that are technical and unfamiliar.  It may appear blunt or direct.  Medical documents are intended to carry relevant information, facts as evident, and the clinical opinion of the practitioner.

## 2025-07-06 NOTE — PLAN OF CARE
Problem: BIRTH - VAGINAL/ SECTION  Goal: Fetal and maternal status remain reassuring during the birth process  Description: INTERVENTIONS:  - Monitor vital signs  - Monitor fetal heart rate  - Monitor uterine activity  - Monitor labor progression (vaginal delivery)  - DVT prophylaxis (C/S delivery)  - Surgical antibiotic prophylaxis (C/S delivery)  2025 by Cyndi Hare RN  Outcome: Completed  2025 by Cyndi Hare RN  Outcome: Progressing     Problem: PAIN - ADULT  Goal: Verbalizes/displays adequate comfort level or patient's stated pain goal  Description: INTERVENTIONS:  - Encourage pt to monitor pain and request assistance  - Assess pain using appropriate pain scale  - Administer analgesics based on type and severity of pain and evaluate response  - Implement non-pharmacological measures as appropriate and evaluate response  - Consider cultural and social influences on pain and pain management  - Manage/alleviate anxiety  - Utilize distraction and/or relaxation techniques  - Monitor for opioid side effects  - Notify MD/LIP if interventions unsuccessful or patient reports new pain  - Anticipate increased pain with activity and pre-medicate as appropriate  2025 by Cyndi Hare RN  Outcome: Completed  2025 by Cyndi Hare RN  Outcome: Progressing     Problem: ANXIETY  Goal: Will report anxiety at manageable levels  Description: INTERVENTIONS:  - Administer medication as ordered  - Teach and rehearse alternative coping skills  - Provide emotional support with 1:1 interaction with staff  2025 by Cyndi Hare RN  Outcome: Completed  2025 by Cyndi Hare RN  Outcome: Progressing     Problem: Patient/Family Goals  Goal: Patient/Family Long Term Goal  Description: Patient's Long Term Goal: Adequate pain management and   Uncomplicated vaginal delivery    Interventions:  VS per protocol  I&O  Ice chips and sips as  tolerated  EFM per protocol  Maintain IV as ordered  Antibiotics as needed per protocol  Informed consent  7/5/2025 2157 by Cyndi Hare, RN  Outcome: Completed  7/5/2025 1950 by Cyndi Hare, RN  Outcome: Progressing  Goal: Patient/Family Short Term Goal  Description: Patient's Short Term Goal: Have a safe delivery    Interventions:  Pain assessment scores as ordered  Patient scores pain a \"3\" or less  Multidisciplinary care   Nonpharmacologic comfort measures    7/5/2025 2157 by Cyndi Hare, RN  Outcome: Completed  7/5/2025 1950 by Cyndi Hare, RN  Outcome: Progressing

## 2025-07-07 NOTE — PROGRESS NOTES
Discharge patient home as order. Teaching complete, patient feel comfortable in taking care of herself and  infant. Hugs off, send both mom and infant to their family car @ [phone].

## 2025-07-07 NOTE — PLAN OF CARE
Problem: POSTPARTUM  Goal: Establishment of adequate milk supply with medication/procedure interruptions  Description: INTERVENTIONS:  - Review techniques for milk expression (breast pumping).   - Provide pumping equipment/supplies, instructions, and assistance until it is safe to breastfeed infant.  Outcome: Completed     Problem: POSTPARTUM  Goal: Appropriate maternal -  bonding  Description: INTERVENTIONS:  - Assess caregiver- interactions.  - Assess caregiver's emotional status and coping mechanisms.  - Encourage caregiver to participate in  daily care.  - Assess support systems available to mother/family.  - Provide /case management support as needed.  Outcome: Completed

## 2025-07-15 NOTE — PROGRESS NOTES
Outgoing cradle call #2 placed. No answer. Left general voicemail message.   Will send out e-communication letters

## (undated) NOTE — LETTER
Date & Time: 6/29/2023, 4:49 PM  Patient: Rowan Hutchinson  Encounter Provider(s):    Nicole Schmitt MD       To Whom It May Concern: Rowan Hutchinson was seen and treated in our department on 6/29/2023. She should not return to work until Friday, June 30, 2023 .     If you have any questions or concerns, please do not hesitate to call.        _____________________________  Physician/APC Signature